# Patient Record
Sex: FEMALE | Race: OTHER | NOT HISPANIC OR LATINO | URBAN - METROPOLITAN AREA
[De-identification: names, ages, dates, MRNs, and addresses within clinical notes are randomized per-mention and may not be internally consistent; named-entity substitution may affect disease eponyms.]

---

## 2022-03-27 ENCOUNTER — EMERGENCY (EMERGENCY)
Facility: HOSPITAL | Age: 52
LOS: 1 days | Discharge: HOME | End: 2022-03-27
Admitting: STUDENT IN AN ORGANIZED HEALTH CARE EDUCATION/TRAINING PROGRAM
Payer: COMMERCIAL

## 2022-03-27 VITALS
SYSTOLIC BLOOD PRESSURE: 164 MMHG | OXYGEN SATURATION: 99 % | TEMPERATURE: 98 F | HEART RATE: 84 BPM | DIASTOLIC BLOOD PRESSURE: 87 MMHG | RESPIRATION RATE: 18 BRPM

## 2022-03-27 VITALS — DIASTOLIC BLOOD PRESSURE: 89 MMHG | SYSTOLIC BLOOD PRESSURE: 168 MMHG

## 2022-03-27 PROCEDURE — 99282 EMERGENCY DEPT VISIT SF MDM: CPT

## 2022-03-27 NOTE — ED PROVIDER NOTE - PATIENT PORTAL LINK FT
You can access the FollowMyHealth Patient Portal offered by [facility] by registering at the following website: [email or web address]. By joining [employer]s FollowMyHealth portal, you will also be able to view your health information using other applications (apps) compatible with our system.

## 2022-03-27 NOTE — ED PROVIDER NOTE - CLINICAL SUMMARY MEDICAL DECISION MAKING FREE TEXT BOX
51F here for evaluation of painless red right eye x 3 days.     exam c/w subconjunctival hemorrhage, patient provided with reassurance

## 2022-03-27 NOTE — ED PROVIDER NOTE - OBJECTIVE STATEMENT
51F here for evaluation of painless red right eye x 3 days.     States her BP readings have been high but she is not formally diagnosed with HTN. 51F here for evaluation of painless red right eye x 3 days.   Denies any discharge, visual changes, pain, trauma/injury, strenuous activity.   States her BP readings have been high but she is not formally diagnosed with HTN.

## 2022-03-27 NOTE — ED PROVIDER NOTE - NSFOLLOWUPINSTRUCTIONS_ED_ALL_ED_FT
Please follow up with Ophthamologist in 2-3 days for re-evaluation.    RETURN TO ER IF:  •You have eye pain or sensitivity to light  •Your vision changes.  •You have white or yellow discharge from your eye.  You have a headache, vomiting, dizziness.     Subconjunctival Hemorrhage    WHAT YOU NEED TO KNOW:    What is a subconjunctival hemorrhage? A subconjunctival hemorrhage is when blood collects under the conjunctiva in your eye. The conjunctiva is the clear lining that covers the white part of your eye. The blood comes from broken blood vessels under the conjunctiva.    What causes a subconjunctival hemorrhage? The exact cause of your subconjunctival hemorrhage may not be known. The following are common causes:   •An accident or injury to the eye      •Hard coughs or sneezes      •Medical conditions, such as high blood pressure, diabetes, or a bleeding disorder      •Strain, such as when you lift a heavy object or have a bowel movement      •Blood thinning medicines      •Vomiting      What are the signs and symptoms of a subconjunctival hemorrhage? The most common sign is a red patch on the white part of your eye. The redness may be present for 2 to 3 weeks. You may have mild pain when you move your eye.    How is a subconjunctival hemorrhage diagnosed? Your healthcare provider will examine your eye and check your vision. You may need tests to check for an underlying medical condition.    How is a subconjunctival hemorrhage treated? A subconjunctival hemorrhage usually goes away on its own. You may need any of the following to help manage your symptoms:   •Cold or warm compress: Use a cold pack during the first 24 hours. Ask how often to apply it and for how long each time. After the first 24 hours, apply a warm pack on your eye. Do this 3 times each day for about 10 to 15 minutes each time.    •Eyedrops: You may need artificial tears to keep your eye moist. Use the drops as directed.  Steps 1 2 3 4      When should I contact my healthcare provider?   •The redness in your eye has not gone away after 3 weeks.    •You have another subconjunctival hemorrhage.    •You have subconjunctival hemorrhages in both eyes.    •You have questions or concerns about your condition or care. Please follow up with Ophthalmologist in 2-3 days for re-evaluation.    RETURN TO ER IF:  •You have eye pain or sensitivity to light  •Your vision changes.  •You have white or yellow discharge from your eye.  You have a headache, vomiting, dizziness.     Subconjunctival Hemorrhage    WHAT YOU NEED TO KNOW:    What is a subconjunctival hemorrhage? A subconjunctival hemorrhage is when blood collects under the conjunctiva in your eye. The conjunctiva is the clear lining that covers the white part of your eye. The blood comes from broken blood vessels under the conjunctiva.    What causes a subconjunctival hemorrhage? The exact cause of your subconjunctival hemorrhage may not be known. The following are common causes:   •An accident or injury to the eye      •Hard coughs or sneezes      •Medical conditions, such as high blood pressure, diabetes, or a bleeding disorder      •Strain, such as when you lift a heavy object or have a bowel movement      •Blood thinning medicines      •Vomiting      What are the signs and symptoms of a subconjunctival hemorrhage? The most common sign is a red patch on the white part of your eye. The redness may be present for 2 to 3 weeks. You may have mild pain when you move your eye.    How is a subconjunctival hemorrhage diagnosed? Your healthcare provider will examine your eye and check your vision. You may need tests to check for an underlying medical condition.    How is a subconjunctival hemorrhage treated? A subconjunctival hemorrhage usually goes away on its own. You may need any of the following to help manage your symptoms:   •Cold or warm compress: Use a cold pack during the first 24 hours. Ask how often to apply it and for how long each time. After the first 24 hours, apply a warm pack on your eye. Do this 3 times each day for about 10 to 15 minutes each time.    •Eyedrops: You may need artificial tears to keep your eye moist. Use the drops as directed.  Steps 1 2 3 4      When should I contact my healthcare provider?   •The redness in your eye has not gone away after 3 weeks.    •You have another subconjunctival hemorrhage.    •You have subconjunctival hemorrhages in both eyes.    •You have questions or concerns about your condition or care.

## 2022-03-27 NOTE — ED PROVIDER NOTE - NSFOLLOWUPCLINICS_GEN_ALL_ED_FT
[facility] - Ophthalmology  Ophthalmology  [address], [address]  [place] [address]  Phone: [phone]  Fax:     [facility]  Ophthalmology  [address], [address]  [place] [address]  Phone: [phone]  Fax:

## 2025-07-04 NOTE — ED PROVIDER NOTE - EYES [+], MLM
For information on Fall & Injury Prevention, visit: [email or web address] OR  [email or web address] OR  https://www.cdc.gov/steadi/patient.html
REDNESS